# Patient Record
Sex: FEMALE | Race: OTHER | HISPANIC OR LATINO | URBAN - METROPOLITAN AREA
[De-identification: names, ages, dates, MRNs, and addresses within clinical notes are randomized per-mention and may not be internally consistent; named-entity substitution may affect disease eponyms.]

---

## 2017-07-11 ENCOUNTER — EMERGENCY (EMERGENCY)
Facility: HOSPITAL | Age: 37
LOS: 0 days | Discharge: HOME | End: 2017-07-11

## 2017-07-11 DIAGNOSIS — I10 ESSENTIAL (PRIMARY) HYPERTENSION: ICD-10-CM

## 2017-07-11 DIAGNOSIS — M25.522 PAIN IN LEFT ELBOW: ICD-10-CM

## 2017-07-19 ENCOUNTER — EMERGENCY (EMERGENCY)
Facility: HOSPITAL | Age: 37
LOS: 0 days | Discharge: HOME | End: 2017-07-19
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
## 2017-07-19 DIAGNOSIS — M79.603 PAIN IN ARM, UNSPECIFIED: ICD-10-CM
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
## 2017-07-19 DIAGNOSIS — I10 ESSENTIAL (PRIMARY) HYPERTENSION: ICD-10-CM

## 2017-07-19 DIAGNOSIS — S50.11XA CONTUSION OF RIGHT FOREARM, INITIAL ENCOUNTER: ICD-10-CM

## 2018-07-13 ENCOUNTER — EMERGENCY (EMERGENCY)
Facility: HOSPITAL | Age: 38
LOS: 0 days | Discharge: HOME | End: 2018-07-14
Attending: EMERGENCY MEDICINE | Admitting: EMERGENCY MEDICINE
Payer: MEDICAID

## 2018-07-13 VITALS
OXYGEN SATURATION: 97 % | SYSTOLIC BLOOD PRESSURE: 141 MMHG | RESPIRATION RATE: 18 BRPM | DIASTOLIC BLOOD PRESSURE: 89 MMHG | HEART RATE: 62 BPM | TEMPERATURE: 99 F | WEIGHT: 130.07 LBS

## 2018-07-13 DIAGNOSIS — M79.604 PAIN IN RIGHT LEG: ICD-10-CM

## 2018-07-13 DIAGNOSIS — R51 HEADACHE: ICD-10-CM

## 2018-07-13 DIAGNOSIS — M79.89 OTHER SPECIFIED SOFT TISSUE DISORDERS: ICD-10-CM

## 2018-07-13 PROCEDURE — 93970 EXTREMITY STUDY: CPT | Mod: 26

## 2018-07-13 RX ORDER — KETOROLAC TROMETHAMINE 30 MG/ML
30 SYRINGE (ML) INJECTION ONCE
Qty: 0 | Refills: 0 | Status: DISCONTINUED | OUTPATIENT
Start: 2018-07-13 | End: 2018-07-13

## 2018-07-13 NOTE — ED ADULT TRIAGE NOTE - CHIEF COMPLAINT QUOTE
I have a headache and pressure for three weeks, I went to the doctor and he gave me Ibuprofen but it no work, I also have pain here (left lower anterior leg) - patint

## 2018-07-13 NOTE — ED PROVIDER NOTE - ATTENDING CONTRIBUTION TO CARE
39 y/o F here with headache x 3 weeks and R leg pain x 1 mo with intermittent swelling. 37 y/o F here with headache x 3 weeks and R leg pain x 1 mo with intermittent swelling.  Pain in leg is on anterior aspect.  no knee or ankle pain.  no vision changes.  No fever.  Pt states that she hasn't been sleeping well at night in general.  No neck pain.  Pt has been trying ibuprofen with no significant relief.  Saw her PCP about these sxs, but is still having them so came here for eval.  EXAM: well appearing. NAD. s1s2, reg. CTAB. abd soft, nd, nt.  No leg pain or swelling.  no calf ttp.  no rash.  PERRL. EOMI. No nuchal rigidity.  P: pain control.  DVT study, head CT.

## 2018-07-13 NOTE — ED PROVIDER NOTE - NS ED ROS FT
Constitutional: no fever, chills, no recent weight loss, change in appetite or malaise  Eyes: no redness/discharge/pain/vision changes  ENT: no rhinorrhea/ear pain/sore throat  Cardiac: No chest pain, SOB or edema.  Respiratory: No cough or respiratory distress  GI: No nausea, vomiting, diarrhea or abdominal pain.  : No dysuria, frequency, urgency or hematuria  MS: no pain to back or other extremities, no loss of ROM, no weakness  Neuro: No weakness. No LOC.  Skin: No skin rash.  Endocrine: No history of thyroid disease or diabetes.  Except as documented in the HPI, all other systems are negative.

## 2018-07-13 NOTE — ED PROVIDER NOTE - MEDICAL DECISION MAKING DETAILS
I have personally performed a history and physical exam on this patient and personally directed the management of the patient. Pt well appearing at time of d/c.  Continue ibuprofen, tylenol as needed for pain control at home.  F/U with PCP.

## 2018-07-13 NOTE — ED PROVIDER NOTE - PROGRESS NOTE DETAILS
as per vasc tech neg for dvt Pt aware that we will have official radiology read pending, and may result in change of xray read.  Pt voices understanding of use of medications, instructions for care, and reasons to return or to go to ED  Pt extensively counseled - warning si/sxs d/w pt. Case d/w pt at length including risks vs benefits of different management/treatment options. Pt instructed to return to ed for re-evaluation or f/u with PCP should sxs persist. Pt verbalizes an understanding & agreement with the plan as discussed

## 2018-07-13 NOTE — ED PROVIDER NOTE - OBJECTIVE STATEMENT
pt with progressively worsening HA for 3 weeks and atx right leg pain for 1 month  saw PMD 4 days ago, told to take ibuprofen, no relief  pt not working currently, no recent travel, no sick contacts  no other sxs- see HPI, but pt admits not sleeping well lately

## 2018-07-14 RX ADMIN — Medication 30 MILLIGRAM(S): at 00:22

## 2024-11-20 NOTE — ED ADULT NURSE NOTE - CHIEF COMPLAINT QUOTE
I have a headache and pressure for three weeks, I went to the doctor and he gave me Ibuprofen but it no work, I also have pain here (left lower anterior leg) - patint
Add Pregnancy And Lactation Warning To Medication Counseling?: No
Initial Quantiferon Gold (Optional): negative
Sotyktu Dosing: 6mg Daily
Pregnancy And Lactation Warning Text: There is insufficient data to evaluate whether or not Sotyktu is safe to use during pregnancy.   It is not known if Sotyktu passes into breast milk and whether or not it is safe to use when breastfeeding.  
Diagnosis (Required): Psoriasis
Sotyktu Monitoring Guidelines: LFTs, lipids, hepatitis screening, TB screening and pregnancy tests should be checked prior to initiating Sotyktu therapy.  Labs may also be checked periodically after the initiation period.
Detail Level: Zone